# Patient Record
Sex: FEMALE | Race: WHITE | NOT HISPANIC OR LATINO | Employment: OTHER | URBAN - METROPOLITAN AREA
[De-identification: names, ages, dates, MRNs, and addresses within clinical notes are randomized per-mention and may not be internally consistent; named-entity substitution may affect disease eponyms.]

---

## 2019-08-16 ENCOUNTER — HOSPITAL ENCOUNTER (EMERGENCY)
Facility: MEDICAL CENTER | Age: 69
End: 2019-08-16
Attending: EMERGENCY MEDICINE
Payer: COMMERCIAL

## 2019-08-16 VITALS
HEART RATE: 77 BPM | DIASTOLIC BLOOD PRESSURE: 74 MMHG | OXYGEN SATURATION: 100 % | SYSTOLIC BLOOD PRESSURE: 182 MMHG | RESPIRATION RATE: 18 BRPM | TEMPERATURE: 97.7 F | WEIGHT: 136.91 LBS | HEIGHT: 60 IN | BODY MASS INDEX: 26.88 KG/M2

## 2019-08-16 DIAGNOSIS — F41.9 ANXIETY: ICD-10-CM

## 2019-08-16 DIAGNOSIS — G89.29 CHRONIC BILATERAL LOW BACK PAIN WITHOUT SCIATICA: ICD-10-CM

## 2019-08-16 DIAGNOSIS — M54.50 CHRONIC BILATERAL LOW BACK PAIN WITHOUT SCIATICA: ICD-10-CM

## 2019-08-16 LAB
ANION GAP SERPL CALC-SCNC: 8 MMOL/L (ref 0–11.9)
BUN SERPL-MCNC: 24 MG/DL (ref 8–22)
CALCIUM SERPL-MCNC: 8.9 MG/DL (ref 8.5–10.5)
CHLORIDE SERPL-SCNC: 109 MMOL/L (ref 96–112)
CO2 SERPL-SCNC: 23 MMOL/L (ref 20–33)
CREAT SERPL-MCNC: 0.71 MG/DL (ref 0.5–1.4)
GLUCOSE SERPL-MCNC: 95 MG/DL (ref 65–99)
POTASSIUM SERPL-SCNC: 4.3 MMOL/L (ref 3.6–5.5)
SODIUM SERPL-SCNC: 140 MMOL/L (ref 135–145)

## 2019-08-16 PROCEDURE — 80048 BASIC METABOLIC PNL TOTAL CA: CPT

## 2019-08-16 PROCEDURE — 700111 HCHG RX REV CODE 636 W/ 250 OVERRIDE (IP): Performed by: EMERGENCY MEDICINE

## 2019-08-16 PROCEDURE — 700102 HCHG RX REV CODE 250 W/ 637 OVERRIDE(OP): Performed by: EMERGENCY MEDICINE

## 2019-08-16 PROCEDURE — 99284 EMERGENCY DEPT VISIT MOD MDM: CPT

## 2019-08-16 PROCEDURE — 96372 THER/PROPH/DIAG INJ SC/IM: CPT

## 2019-08-16 PROCEDURE — A9270 NON-COVERED ITEM OR SERVICE: HCPCS | Performed by: EMERGENCY MEDICINE

## 2019-08-16 RX ORDER — LORAZEPAM 1 MG/1
1 TABLET ORAL ONCE
Status: COMPLETED | OUTPATIENT
Start: 2019-08-16 | End: 2019-08-16

## 2019-08-16 RX ORDER — KETOROLAC TROMETHAMINE 30 MG/ML
30 INJECTION, SOLUTION INTRAMUSCULAR; INTRAVENOUS ONCE
Status: COMPLETED | OUTPATIENT
Start: 2019-08-16 | End: 2019-08-16

## 2019-08-16 RX ADMIN — KETOROLAC TROMETHAMINE 30 MG: 30 INJECTION, SOLUTION INTRAMUSCULAR at 08:37

## 2019-08-16 RX ADMIN — LORAZEPAM 1 MG: 1 TABLET ORAL at 08:37

## 2019-08-16 SDOH — HEALTH STABILITY: MENTAL HEALTH: HOW OFTEN DO YOU HAVE A DRINK CONTAINING ALCOHOL?: NEVER

## 2019-08-16 NOTE — ED PROVIDER NOTES
ED Provider Note    Scribed for Ivania Marcus M.D. by Claudia Sears. 8/16/2019, 7:27 AM.    Primary care provider: None  Means of arrival: Walk in  History obtained from: Patient  History limited by: None    CHIEF COMPLAINT  •  Back Pain    HPI  Taylor Jay is a 69 y.o. female who presents to the Emergency Department complaining of constant chronic low back pain with an acute worsening today. She has a history of chronic back pain which has been worsening the past few days secondary to sitting in hospital chairs while her  has been admitted to the ICU. The patient states that she applies heat therapy and takes Aleve, Tylenol and half a tablet of Flexeril with minimal alleviation of her pain. She takes half a tablet of Flexeril and not a full tablet due to it making her feel dizzy and falling in the past but the half dose doesn't help her pain. She denies any gait instability, bowel or urinary incontinence, weakness to extremities bilaterally or abdominal pain. The patient reports a history of renal ketoacidosis and nephrolithiasis.   Patient also states she is anxious due to her  dying in the ICU.      REVIEW OF SYSTEMS  Pertinent positives include lower back pain and anxiety. Pertinent negatives include no gait instability, bowel or urinary incontinence, weakness to extremities bilaterally, abdominal pain, diabetes, cancer, IVDA, HIV or immunocompromised state or fever.    See HPI for further details.      PAST MEDICAL HISTORY  Patient has a history of chronic low back pain, renal ketoacidosis and nephrolithiasis.       SURGICAL HISTORY  Patient denies any recent surgeries.      SOCIAL HISTORY  Social History     Tobacco Use   • Smoking status: Former Smoker   • Smokeless tobacco: Never Used   Substance Use Topics   • Alcohol use: Not Currently     Frequency: Never     Comment: sober   • Drug use: Never      Social History     Substance and Sexual Activity   Drug Use Never       FAMILY  HISTORY  History reviewed. No pertinent family history.      CURRENT MEDICATIONS  Home Medications     Reviewed by Ryan Shelton R.N. (Registered Nurse) on 08/16/19 at 0545  Med List Status: Partial   Medication Last Dose Status        Patient Varun Taking any Medications                       ALLERGIES  None      PHYSICAL EXAM  VITAL SIGNS: /62   Pulse 80   Temp 36.5 °C (97.7 °F) (Temporal)   Resp 18   Ht 1.524 m (5')   Wt 62.1 kg (136 lb 14.5 oz)   SpO2 98%   BMI 26.74 kg/m²       Constitutional: Well developed, Well nourished, No acute distress, Non-toxic appearance.   HENT: No facial asymmetry, no midline cervical tenderness.  Cardiovascular: Normal heart rate, Normal rhythm  Thorax & Lungs: Normal breath sounds, No respiratory distress  Abdomen: Bowel sounds normal, Soft, No tenderness,No pulsatile masses. no saddle anesthesia noted  Skin: Warm, Dry, No erythema, No rash.   Back: no midline tenderness to palpation, diffuse paraspinal muscle spasm, no step-offs, no gross deformity,  no patellar reflex asymmetry.  Extremities: Intact distal pulses, No edema, No tenderness, normal dorsiflexion of great toe bilaterally, +5/5 strength in lower extremity, intact sensation to light touch,   Neurologic: Alert & oriented x 3, Normal motor function, Normal sensory function, No focal deficits noted. Normal gait.  Psych: alert, appropriate, anxious      DIAGNOSTIC STUDIES / PROCEDURES    LABS  Results for orders placed or performed during the hospital encounter of 08/16/19   Basic Metabolic Panel   Result Value Ref Range    Sodium 140 135 - 145 mmol/L    Potassium 4.3 3.6 - 5.5 mmol/L    Chloride 109 96 - 112 mmol/L    Co2 23 20 - 33 mmol/L    Glucose 95 65 - 99 mg/dL    Bun 24 (H) 8 - 22 mg/dL    Creatinine 0.71 0.50 - 1.40 mg/dL    Calcium 8.9 8.5 - 10.5 mg/dL    Anion Gap 8.0 0.0 - 11.9   ESTIMATED GFR   Result Value Ref Range    GFR If African American >60 >60 mL/min/1.73 m 2    GFR If Non African  American >60 >60 mL/min/1.73 m 2     All labs reviewed by me.      COURSE & MEDICAL DECISION MAKING  Nursing notes, VS, PMSFHx reviewed in chart.    Patient presents to emergency department with an acute exacerbation of her chronic back pain.  She states sitting in the ICU has flared up her chronic back pain.  Patient denies any midline tenderness.  She not had any fevers.  I doubt an infectious etiology for her back pain.  She is not having any bowel or bladder incontinence or weakness or numbness in her legs.  I doubt acute cord compression or cauda equina.  Patient states is her typical back pain without any new or unique symptoms.  She is requesting a Toradol shot so she can go back up and comfort her .  She is slightly anxious as well.  She does have a history of renal problems and therefore a BMP will be ordered prior to giving a Toradol shot.  She states Toradol always helps her pain.  We discussed the risk benefits at her age of receiving Toradol and she understands.  Patient also received 1 Ativan due to her anxiety related to her .    7:27 AM Patient seen and examined at bedside for exacerbated chronic low back pain. Patient will be treated with Ativan 1 mg PO. Ordered BMP to rule out renal insufficiency.       Labs have returned and show a normal creatinine.  Therefore Toradol will be given.  She will be discharged with follow-up with her regular doctor.  She is to continue gentle stretching and other over-the-counter medications for her back pain.  She also advised to continue icing.  Return precautions were given peer           DISPOSITION:  Patient will be discharged home in stable condition.    FOLLOW UP:  your regular doctor    Schedule an appointment as soon as possible for a visit   If symptoms worsen, return to the er.        The patient is referred to a primary physician for blood pressure management, diabetic screening, and for all other preventative health concerns.        FINAL  IMPRESSION  1. Chronic bilateral low back pain without sciatica    2. Anxiety          I, Claudia Sears (Scribe), am scribing for, and in the presence of, Ivania Marcus M.D.    Electronically signed by: Claudia Sears (Scribe), 8/16/2019    Ivania RENDON M.D. personally performed the services described in this documentation, as scribed by Claudia Sears in my presence, and it is both accurate and complete. C.    The note accurately reflects work and decisions made by me.  Ivania Marcus  8/16/2019  8:40 AM

## 2019-08-16 NOTE — ED TRIAGE NOTES
Taylor Jay  69 y.o. female  Chief Complaint   Patient presents with   • Back Pain     Pt reports chronic stabbing back pain, worsening the past few days.     Pt ambulated to triage with steady gait for above complaint.      Pt is currently staying in SICU with her sick . Pt reports chronic pain has worsened with the poor sleeping environment. She is looking for a Toradol shot.     Pt back to lobby. Educated to inform staff of any concerns or changes.     Blood Pressure : (!) 180/99, Pulse: 97, Respiration: 18, Temperature: 36.5 °C (97.7 °F), Height: 152.4 cm (5'), Weight: 62.1 kg (136 lb 14.5 oz), Pulse Oximetry: 99 %, O2 Delivery: None (Room Air)

## 2019-08-16 NOTE — ED NOTES
Pt discharged home with instructions to follow up with PCP for BP and diabetes screenings, Pt to return to ED for any new or worsening symptoms.

## 2019-08-16 NOTE — DISCHARGE INSTRUCTIONS
No heavy lifting or bending for 3 days.  Return for fever, leg weakness (inability to walk or bear weight), loss of bowel or bladder control, fever, rash or any new symptoms or concern.  Take the medications as directed.  Gentle stretching.     Please follow up with a primary physician for blood pressure management, diabetic screening, and all other preventive health concerns.

## 2019-08-17 ENCOUNTER — HOSPITAL ENCOUNTER (EMERGENCY)
Facility: MEDICAL CENTER | Age: 69
End: 2019-08-17
Attending: EMERGENCY MEDICINE
Payer: COMMERCIAL

## 2019-08-17 VITALS
HEIGHT: 60 IN | RESPIRATION RATE: 16 BRPM | OXYGEN SATURATION: 98 % | BODY MASS INDEX: 26.75 KG/M2 | TEMPERATURE: 98.1 F | DIASTOLIC BLOOD PRESSURE: 74 MMHG | HEART RATE: 82 BPM | SYSTOLIC BLOOD PRESSURE: 138 MMHG | WEIGHT: 136.24 LBS

## 2019-08-17 DIAGNOSIS — M54.50 LUMBAR BACK PAIN: ICD-10-CM

## 2019-08-17 PROCEDURE — 700111 HCHG RX REV CODE 636 W/ 250 OVERRIDE (IP): Performed by: EMERGENCY MEDICINE

## 2019-08-17 PROCEDURE — 99283 EMERGENCY DEPT VISIT LOW MDM: CPT

## 2019-08-17 PROCEDURE — 96372 THER/PROPH/DIAG INJ SC/IM: CPT

## 2019-08-17 RX ORDER — ONDANSETRON 2 MG/ML
4 INJECTION INTRAMUSCULAR; INTRAVENOUS ONCE
Status: COMPLETED | OUTPATIENT
Start: 2019-08-17 | End: 2019-08-17

## 2019-08-17 RX ORDER — MORPHINE SULFATE 10 MG/ML
8 INJECTION, SOLUTION INTRAMUSCULAR; INTRAVENOUS ONCE
Status: COMPLETED | OUTPATIENT
Start: 2019-08-17 | End: 2019-08-17

## 2019-08-17 RX ORDER — POTASSIUM CITRATE MONOHYDRATE 100 %
POWDER (GRAM) MISCELLANEOUS
COMMUNITY

## 2019-08-17 RX ADMIN — MORPHINE SULFATE 8 MG: 10 INJECTION INTRAVENOUS at 08:49

## 2019-08-17 RX ADMIN — ONDANSETRON 4 MG: 2 INJECTION INTRAMUSCULAR; INTRAVENOUS at 08:49

## 2019-08-17 ASSESSMENT — ENCOUNTER SYMPTOMS
NAUSEA: 1
FEVER: 0
CHILLS: 0
TINGLING: 0
VOMITING: 0
BACK PAIN: 1

## 2019-08-17 NOTE — ED NOTES
"Pt with chronic back pain for 30 years, MD in [place] \"finally figured it out to do further testing\". Pt states Toradol ineffective.   "

## 2019-08-17 NOTE — ED TRIAGE NOTES
Pt in w/c to triage with   Chief Complaint   Patient presents with   • Back Pain     seen here yesterday, pt was given ativan and toradol; pt returns for continued pain   • Other       yesterday in ICU.      Pt Informed regarding triage process and verbalized understanding to inform triage tech or RN for any changes in condition. Placed in lobby.

## 2019-08-17 NOTE — ED PROVIDER NOTES
ED Provider Note    Scribed for Manjinder Salazar M.D. by Emiliana White. 2019, 8:27 AM.    Primary care provider: Dr. Bimal Araiza at [facility]   Means of arrival: Wheel chair  History obtained from: Patient   History limited by: None    CHIEF COMPLAINT  Chief Complaint   Patient presents with   • Back Pain     seen here yesterday, pt was given ativan and toradol; pt returns for continued pain   • Other       yesterday in ICU.     HPI  Taylor Jay is a 69 y.o. female with a history of chronic back pain for the past 30 years who presents to the Emergency Department today for evaluation of persistent back pain onset yesterday. Patient was last seen in the ED 19 for same complaint of back pain and was treated with Toradol and Ativan and then discharged home once her pain was improved. Her back pain today is not different and is described as stabbing in quality. She adds that she had insomnia yesterday night because of her pain and husbands passing yesterday in the ICU. She also endorses nausea today secondary to taking medication. Per patient, she has had a CT done in the past for her back pain as well as imaging on her lumbar thoracic spine to look for compression fractures which were negative. She has never had an MRI. Patient is from out of town and states that she has finally found a doctor in [place] who agreed to do further testing. She denies any fevers, chills, bowel or bladder incontinence, numbness or tingling.    REVIEW OF SYSTEMS  Review of Systems   Constitutional: Negative for chills and fever.   Gastrointestinal: Positive for melena and nausea. Negative for vomiting.   Genitourinary:        Negative for difficulty controlling bowel movements   Musculoskeletal: Positive for back pain.   Neurological: Negative for tingling.        Negative for numbness       PAST MEDICAL HISTORY  Chronic back pain    SURGICAL HISTORY  patient denies any surgical history    SOCIAL HISTORY  Social  History     Tobacco Use   • Smoking status: Former Smoker   • Smokeless tobacco: Never Used   Substance Use Topics   • Alcohol use: Not Currently     Frequency: Never     Comment: sober   • Drug use: Never      Social History     Substance and Sexual Activity   Drug Use Never       FAMILY HISTORY  No family history on file.    CURRENT MEDICATIONS  Home Medications     Reviewed by Cassandra Resendiz R.N. (Registered Nurse) on 08/17/19 at 0724  Med List Status: Partial   Medication Last Dose Status   ATORVASTATIN CALCIUM PO  Active   buPROPion HCl (WELLBUTRIN PO)  Active   Ciclesonide (ALVESCO INH)  Active   Potassium Citrate Powder  Active                ALLERGIES  No Known Allergies    PHYSICAL EXAM  VITAL SIGNS: /96   Pulse (!) 101   Temp 36.7 °C (98.1 °F) (Temporal)   Resp 16   Ht 1.524 m (5')   Wt 61.8 kg (136 lb 3.9 oz)   SpO2 99%   BMI 26.61 kg/m²     Constitutional: No acute distress  HENT: Moist mucous membranes  Eyes: No conjunctivitis or icterus  Neck: Trachea is midline, no palpable thyroid  Lymphatic: No cervical lymphadenopathy  Cardiovascular: Regular rate and rhythm, no murmurs  Thorax & Lungs: Normal breath sounds, no rhonchi  Abdomen: Soft, Non-tender  Skin: No rash  Back: Moves her back with full range of motion. Non-tender, no CVA tenderness  Extremities: Moves all extremities. No edema  Vascular: symmetric radial pulse  Neurologic: Alert and oriented. Normal gross motor      COURSE & MEDICAL DECISION MAKING  Pertinent Labs & Imaging studies reviewed. (See chart for details)    8:27 AM - Patient seen and examined at bedside. Plan of care was discussed with the patient which includes treating her symptomatically. Patient understands that if she is treated with pain meds today, she cannot drive once leaving the ED. She verbalized her understanding and agrees with the plan of care. Patient will be treated with Morphine 10 mg, and Zofran mg.The differential diagnoses include but are not  limited to: chronic pain exacerbation. She was given a referral to her PCP and instructed to return to the ED if her symptoms worsen. Patient understands and agrees.       I reviewed prescription monitoring program for patient's narcotic use before prescribing a scheduled drug.The patient will not drink alcohol nor drive with prescribed medications.The patient will return for new or worsening symptoms and is stable at the time of discharge.    The patient is referred to a primary physician for blood pressure management, diabetic screening, and for all other preventative health concerns.    DISPOSITION:  Patient will be discharged home in stable condition.    FOLLOW UP:  No follow-up provider specified.    OUTPATIENT MEDICATIONS:  Discharge Medication List as of 8/17/2019  9:28 AM      '    FINAL IMPRESSION  1. Lumbar back pain          Emiliana RENDON (Eliza), am scribing for, and in the presence of, Manjinder Salazar M.D..    Electronically signed by: Emiliana White (Eliza), 8/17/2019    IManjinder M.D. personally performed the services described in this documentation, as scribed by Emiliana White in my presence, and it is both accurate and complete.    E    The note accurately reflects work and decisions made by me.  Manjinder Salazar  8/17/2019  3:30 PM